# Patient Record
Sex: MALE | Race: OTHER | HISPANIC OR LATINO | URBAN - METROPOLITAN AREA
[De-identification: names, ages, dates, MRNs, and addresses within clinical notes are randomized per-mention and may not be internally consistent; named-entity substitution may affect disease eponyms.]

---

## 2020-12-10 ENCOUNTER — EMERGENCY (EMERGENCY)
Facility: HOSPITAL | Age: 55
LOS: 1 days | Discharge: HOME | End: 2020-12-10
Attending: EMERGENCY MEDICINE
Payer: COMMERCIAL

## 2020-12-10 VITALS
HEART RATE: 61 BPM | OXYGEN SATURATION: 98 % | RESPIRATION RATE: 20 BRPM | TEMPERATURE: 98 F | DIASTOLIC BLOOD PRESSURE: 112 MMHG | HEIGHT: 65 IN | SYSTOLIC BLOOD PRESSURE: 162 MMHG

## 2020-12-10 PROCEDURE — 72128 CT CHEST SPINE W/O DYE: CPT | Mod: 26

## 2020-12-10 PROCEDURE — 73010 X-RAY EXAM OF SHOULDER BLADE: CPT | Mod: 26

## 2020-12-10 PROCEDURE — 90471 IMMUNIZATION ADMIN: CPT

## 2020-12-10 PROCEDURE — 73030 X-RAY EXAM OF SHOULDER: CPT

## 2020-12-10 PROCEDURE — 90715 TDAP VACCINE 7 YRS/> IM: CPT

## 2020-12-10 PROCEDURE — 72128 CT CHEST SPINE W/O DYE: CPT

## 2020-12-10 PROCEDURE — 73030 X-RAY EXAM OF SHOULDER: CPT | Mod: 26,RT

## 2020-12-10 PROCEDURE — 73010 X-RAY EXAM OF SHOULDER BLADE: CPT

## 2020-12-10 PROCEDURE — 99284 EMERGENCY DEPT VISIT MOD MDM: CPT

## 2020-12-10 PROCEDURE — 99284 EMERGENCY DEPT VISIT MOD MDM: CPT | Mod: 25

## 2020-12-10 RX ORDER — OXYCODONE AND ACETAMINOPHEN 5; 325 MG/1; MG/1
1 TABLET ORAL ONCE
Refills: 0 | Status: DISCONTINUED | OUTPATIENT
Start: 2020-12-10 | End: 2020-12-10

## 2020-12-10 RX ORDER — TETANUS TOXOID, REDUCED DIPHTHERIA TOXOID AND ACELLULAR PERTUSSIS VACCINE, ADSORBED 5; 2.5; 8; 8; 2.5 [IU]/.5ML; [IU]/.5ML; UG/.5ML; UG/.5ML; UG/.5ML
0.5 SUSPENSION INTRAMUSCULAR ONCE
Refills: 0 | Status: COMPLETED | OUTPATIENT
Start: 2020-12-10 | End: 2020-12-10

## 2020-12-10 RX ORDER — KETOROLAC TROMETHAMINE 30 MG/ML
30 SYRINGE (ML) INJECTION ONCE
Refills: 0 | Status: DISCONTINUED | OUTPATIENT
Start: 2020-12-10 | End: 2020-12-10

## 2020-12-10 RX ADMIN — TETANUS TOXOID, REDUCED DIPHTHERIA TOXOID AND ACELLULAR PERTUSSIS VACCINE, ADSORBED 0.5 MILLILITER(S): 5; 2.5; 8; 8; 2.5 SUSPENSION INTRAMUSCULAR at 17:40

## 2020-12-10 RX ADMIN — OXYCODONE AND ACETAMINOPHEN 1 TABLET(S): 5; 325 TABLET ORAL at 17:40

## 2020-12-10 NOTE — ED PROVIDER NOTE - ATTENDING CONTRIBUTION TO CARE
At work, fall  from approx 5 feet onto the woden pallet . Denies any head trauma or LOC.  He  c.o upper back pain and right scapula pain as he moves no cp, no sob  plan xray and pain control

## 2020-12-10 NOTE — ED PROVIDER NOTE - CLINICAL SUMMARY MEDICAL DECISION MAKING FREE TEXT BOX
55 y.o male [facility]  Htn BIBA in ER  S.p fall at job 1 hr PTA. states while he was working fall  from approx 5 feet onto the woden pallet with upper back pain and scapula pain   xray shoulder and scapula . percocet , update tetanus , ct thoracic spine

## 2020-12-10 NOTE — ED PROVIDER NOTE - OBJECTIVE STATEMENT
55 y.o male P[facility] of  Htn BIBA in ER  S.p fall at job 1 hr PTA. states while he was working fall  from approx 5 feet onto the woden pallet . denies any head trauma or lOC . he is c.o upper back pain and right scapula pain as he moves . denies any neck pain , h.a or dizziness. chest pain ,or SOB abd pain or numbness or tingling in UE OR LE. unknown last tetanus

## 2020-12-10 NOTE — ED PROVIDER NOTE - NSFOLLOWUPCLINICS_GEN_ALL_ED_FT
[facility] - [facility]  Ortho/Spine  [address] [place] [id number]  Phone: [phone]  Fax:   Follow Up Time:

## 2020-12-10 NOTE — ED PROVIDER NOTE - NSFOLLOWUPINSTRUCTIONS_ED_ALL_ED_FT
please call and follow up with primary care within 1-2 days   take over the counter Advil alternative tylenol for the pain   please call and follow up with orthopedic if the pain continues   keep the area on the back dry and clean and keep it cover     Back Pain    Back pain is very common in adults. The cause of back pain is rarely dangerous and the pain often gets better over time. The cause of your back pain may not be known and may include strain of muscles or ligaments, degeneration of the spinal disks, or arthritis. Occasionally the pain may radiate down your leg(s). Over-the-counter medicines to reduce pain and inflammation are often the most helpful. Stretching and remaining active frequently helps the healing process.     SEEK IMMEDIATE MEDICAL CARE IF YOU HAVE ANY OF THE FOLLOWING SYMPTOMS: bowel or bladder control problems, unusual weakness or numbness in your arms or legs, nausea or vomiting, abdominal pain, fever, dizziness/lightheadedness.      Abrasion    WHAT YOU NEED TO KNOW:    What is an abrasion? An abrasion is a scrape on your skin. It happens when your skin rubs against a rough surface. Some examples of an abrasion include rug burn, a skinned elbow, or road rash. Abrasions can be many shapes and sizes. The wound may hurt, bleed, bruise, or swell.     How can I care for my abrasion?   •Wash your hands and dry them with a clean towel.      •Press a clean cloth against your wound to stop any bleeding.      •Rinse your wound with a lot of clean water. Do not use harsh soap, alcohol, or iodine solutions.      •Use a clean, wet cloth to remove any objects, such as small pieces of rocks or dirt.      •Rub antibiotic ointment on your wound. This may help prevent infection and help your wound heal.      •Cover the wound with a non-stick bandage. Change the bandage daily, and if gets wet or dirty.       When should I seek immediate care?   •The bleeding does not stop after 10 minutes of firm pressure.      •You cannot rinse one or more foreign objects out of your wound.      •You have red streaks on your skin coming from your wound.      When should I contact my healthcare provider?   •You have a fever or chills.       •Your abrasion is red, warm, swollen, or draining pus.      •You have questions or concerns about your condition or care.      CARE AGREEMENT:    You have the right to help plan your care. Learn about your health condition and how it may be treated. Discuss treatment options with your healthcare providers to decide what care you want to receive. You always have the right to refuse treatment.

## 2020-12-10 NOTE — ED PROVIDER NOTE - CARE PLAN
Principal Discharge DX:	Upper back pain  Secondary Diagnosis:	Right shoulder pain, unspecified chronicity  Secondary Diagnosis:	Fall, initial encounter

## 2020-12-10 NOTE — ED ADULT TRIAGE NOTE - CHIEF COMPLAINT QUOTE
Fell from approx 5 feet onto a wooden palette c/o right upper back pain.  laceration to site, bleeding controlled. site is tender to palpation. no difficulty breathing.  arrives in c-collar.

## 2020-12-10 NOTE — ED PROVIDER NOTE - CONSTITUTIONAL, MLM
normal... Well appearing, awake, alert, oriented to person, place, time/situation and in no apparent distress. on collar

## 2020-12-10 NOTE — ED PROVIDER NOTE - PATIENT PORTAL LINK FT
You can access the FollowMyHealth Patient Portal offered by [facility] by registering at the following website: [email or web address]. By joining [employer]’s FollowMyHealth portal, you will also be able to view your health information using other applications (apps) compatible with our system.

## 2020-12-10 NOTE — ED PROVIDER NOTE - MUSCULOSKELETAL, MLM
no midline Cervical and lumbar spine TTP w.o para spine TTP . right wing of scapula TTP , T spine midline TTP over the T4-5 area . LE and UE strength grossly intact